# Patient Record
Sex: FEMALE | Race: WHITE
[De-identification: names, ages, dates, MRNs, and addresses within clinical notes are randomized per-mention and may not be internally consistent; named-entity substitution may affect disease eponyms.]

---

## 2019-08-28 NOTE — CT
CT CHEST WITHOUT CONTRAST:

Axial tomograms were obtained through the chest without IV enhancement.

 

Lose-dose protocol was followed for screening.

 

INDICATION: 

Nicotine dependence.  Currently smokes 1 pack per day.  

 

No comparison.

 

FINDINGS: 

Lungs show hyperexpansion with emphysematous changes primarily in the upper lobes with central lobar 
emphysematous change and small peripheral bullae in the apices.  No evidence of effusion or infiltrat
e.  

 

A 4 mm nodule posterior right upper lobe, image 124 axial.  

 

Small 4 mm pleural-based nodule posterior right lower lobe superiorly, image 132 axial.

 

Asymmetric pleural thickening posterior left lower lobe image 131 axial.

 

No suspicious pulmonary mass or nodule.  

 

The mediastinum appears unremarkable.  Osseous structures unremarkable.  

 

IMPRESSION: 

1.  Chronic lung parenchymal changes with emphysematous changes as described above.

 

2.  Small nodular opacities as described above.

 

Recommend 1-year low-dose screening chest CT.  Lung rads 2.

 

POS: [facility]

## 2019-12-11 NOTE — MMO
Bilateral MAMMO Bilat Diag [facility].

 

CLINICAL HISTORY:

Patient is 59 years old and is seen for diagnostic exam and lump or thickening

in the left breast. The patient has the following family history of breast

cancer:  mother, malignant (generic).  The patient has no personal history of

cancer.

 

VIEWS:

The views performed were:  bilateral craniocaudal with tomosynthesis; bilateral

mediolateral oblique with tomosynthesis; and bilateral mediolateral with

tomosynthesis.

 

FILMS COMPARED:

The present examination has been compared to prior imaging studies performed at

[facility] on 11/21/2016, 11/27/2017, 11/29/2018 and 12/11/2019.

 

This study has been interpreted with the assistance of computer-aided detection.

 

MAMMOGRAM FINDINGS:

The breasts are extremely dense, which may lower the sensitivity of mammography.

 

There is no radiographic abnormality in the region of the palpable abnormality

in the left breast.

 

No sonographic abnormality is seen in the region of palpable abnormality.

 

In the right breast, there are no suspicious masses, calcifications or areas of

architectural distortion.

 

IMPRESSION:

THERE IS NO MAMMOGRAPHIC EVIDENCE OF MALIGNANCY.

 

NO MAMMOGRAPHIC OR SONOGRAPHIC ABNORMALITIES ARE PRESENT TO CORRELATE WITH THE

SITE OF PALPABLE CONCERN. THE PATIENT WILL BE REFERRED BACK TO HER CLINICIAN FOR

FURTHER CARE. BIOPSY SHOULD NOT BE PRECLUDED BY THE ABSCENCE OF IMAGING

FINDINGS, IN THE SETTING OF CLINICAL CONCERN FOR MALIGNANCY. THE FINDINGS AND

RECOMMENDATIONS WERE DISCUSSED WITH THE PATIENT PRIOR TO HER LEAVING THE CENTER.

 

A ROUTINE FOLLOW-UP MAMMOGRAM IN 1 YEAR IS RECOMMENDED.

 

THE RESULTS OF THIS EXAM WERE SENT TO THE PATIENT.

 

ACR BI-RADS Category 1 - Negative

 

MAMMOGRAPHY NOTE:

 1. A negative mammogram report should not delay a biopsy if a dominant of

 clinically suspicious mass is present.

 2. Approximately 10% to 15% of breast cancers are not detected by

 mammography.

 3. Adenosis and dense breasts may obscure an underlying neoplasm.

 

 

Reported by: FARIBA WHATLEY MD

Electonically Signed: [id number]

## 2019-12-11 NOTE — ULT
LEFT BREAST DIAGNOSTIC SONOGRAM:

 

INDICATIONS:

History of palpable abnormality within the left breast.

 

FINDINGS:

Left breast images from breast 9 through 12 o'clock positions were obtained. No suspicious sonographi
c abnormalities seen within the region of palpable concern, upper inner left breast.

 

IMPRESSION:

No suspicious sonographic abnormality within the upper inner left breast.

 

POS: OFF

## 2022-07-20 ENCOUNTER — HOSPITAL ENCOUNTER (OUTPATIENT)
Dept: HOSPITAL 92 - BICMAMMO | Age: 62
Discharge: HOME | End: 2022-07-20
Attending: FAMILY MEDICINE
Payer: COMMERCIAL

## 2022-07-20 DIAGNOSIS — M81.6: Primary | ICD-10-CM

## 2022-07-20 PROCEDURE — 77080 DXA BONE DENSITY AXIAL: CPT
